# Patient Record
Sex: MALE
[De-identification: names, ages, dates, MRNs, and addresses within clinical notes are randomized per-mention and may not be internally consistent; named-entity substitution may affect disease eponyms.]

---

## 2022-08-24 PROBLEM — Z00.00 ENCOUNTER FOR PREVENTIVE HEALTH EXAMINATION: Status: ACTIVE | Noted: 2022-08-24

## 2022-08-26 ENCOUNTER — APPOINTMENT (OUTPATIENT)
Dept: HEART AND VASCULAR | Facility: CLINIC | Age: 37
End: 2022-08-26

## 2022-08-26 ENCOUNTER — NON-APPOINTMENT (OUTPATIENT)
Age: 37
End: 2022-08-26

## 2022-08-26 VITALS
DIASTOLIC BLOOD PRESSURE: 92 MMHG | WEIGHT: 315 LBS | SYSTOLIC BLOOD PRESSURE: 126 MMHG | OXYGEN SATURATION: 94 % | TEMPERATURE: 96.8 F | HEIGHT: 75 IN | HEART RATE: 98 BPM | BODY MASS INDEX: 39.17 KG/M2

## 2022-08-26 PROCEDURE — 93000 ELECTROCARDIOGRAM COMPLETE: CPT

## 2022-08-26 PROCEDURE — 99204 OFFICE O/P NEW MOD 45 MIN: CPT | Mod: 25

## 2022-08-26 NOTE — PHYSICAL EXAM
[Well Developed] : well developed [Well Nourished] : well nourished [No Acute Distress] : no acute distress [Normal Conjunctiva] : normal conjunctiva [Normal] : normal venous pressure, no carotid bruit [No Carotid Bruit] : no carotid bruit [Normal S1, S2] : normal S1, S2 [No Murmur] : no murmur [No Rub] : no rub [phone] [No Gallop] : no gallop [Clear Lung Fields] : clear lung fields [Good Air Entry] : good air entry [No Respiratory Distress] : no respiratory distress  [Soft] : abdomen soft [Non Tender] : non-tender [No Masses/organomegaly] : no masses/organomegaly [Normal Bowel Sounds] : normal bowel sounds [Normal Gait] : normal gait [No Edema] : no edema [No Cyanosis] : no cyanosis [No Clubbing] : no clubbing [No Varicosities] : no varicosities [No Rash] : no rash [No Skin Lesions] : no skin lesions [Alert and Oriented] : alert and oriented

## 2022-08-27 NOTE — DISCUSSION/SUMMARY
[Patient] : the patient [FreeTextEntry4] : Preop Eval [FreeTextEntry1] : Mr. Galeana is a 37 y.o male with a hx of morbid obesity who presents for evaluation and clearance for sleeve bariatric surgery in October of this year. Pt is overall feeling fine with no physical concerns or complaints. Reason for surgery was discussed as well as lifestyle modifications needed for cardiovascular disease risk reduction, including diet and exercise. EKG was wnl. Echo needed. Sleep study is scheduled for 09/2022. Overall, low risk for surgery and is cleared at this time. Will follow up with results of echo and sleep study.

## 2022-08-27 NOTE — HISTORY OF PRESENT ILLNESS
[FreeTextEntry1] : 38 y/o M w morbid obesity here for clearance for sleeve bariatric surgery with Dr. Sr with [facility] in October.\par \par Pt overall is feeling fine, no active concerns or complaints. Mentions he was interest in bariatric surgery because he has had issues losing weight for the past few years. Has tried dieting and working out and all have been unsuccessful. Mentions that his wife says he as episodes of not breathing while sleeping, but as not been evaluated for it and it has not caused him any significant discomfort. He has a sleep study scheduled in 09/2022\par \par CARDIOMETABOLIC RISK FACTORS:\par Family History: Mother no significant history, Father has hx of diabetes\par Lifestyle History:\par   -Mediterranean Diet Score (9 question survey) was x. 3\par       (8-9: optimal, 6-7: near-optimal, 4-5: suboptimal, 0-3: markedly suboptimal)\par   -Self-described diet: for breakfast consumes eggs, sausage and narvaez; steak for lunch; dinner depends on his mood and sometimes fasts. \par   -Exercise: Patient reports exercising at a moderate level for 60 minutes a day walking.\par   -Smoking: Former smoker (1/2 PPD x 4 years; quit 2 years ago).\par   -EtOH: social, once a month\par   -Ilicit drug use: denies\par   -Stress: Moderate financial stress, self employed\par   -Sleep apnea: [ex. STOPBANG]\par ===============================\par RADIOLOGY/IMAGING/DIAGNOSTIC TESTING:\par  -ECHO (date): none\par  -EKG (date): wnl (08/26/2022)\par  -Coronary CTA: none\par  -Calcium score: none\par \par LABS: (results from 07/12/2022)\par  -lipid panel: Total 174, Trig 51, , HDL 48\par  -A1c: 5.8 \par  -lipoprotein A:\par  -TSH\par \par CARDIOMETABOLIC RISK FACTORS:\par Family History: Mother no significant history, Father has hx of diabetes\par

## 2022-08-27 NOTE — ASSESSMENT
[FreeTextEntry1] : -EKG: wnl\par -Social:\par -Self-reported physical activity: no limitation walking on flat ground or stairs.\par \par Assessment:\par -Mets >4\par -RCRI - Class 2 risk, indicating 6% % 30-day risk of death, MI, or cardiac arrest\par -Petersen - 0.1 % risk of MI or cardiac arrest within 30 days of surgery\par \par Pt is low-risk for low-risk surgery.\par There are no apparent contraindications for sleeve bariatric surgery\par

## 2022-08-27 NOTE — END OF VISIT
[] : A student assisted with documenting this visit. I have reviewed and verified all information documented by the student, and made modifications to such information, when appropriate. [FreeTextEntry3] : Patient seen and examined. Case discussed with medical student. Agree with plan as detailed above.  [Time Spent: ___ minutes] : I have spent [unfilled] minutes of time on the encounter.

## 2022-09-09 ENCOUNTER — APPOINTMENT (OUTPATIENT)
Dept: HEART AND VASCULAR | Facility: CLINIC | Age: 37
End: 2022-09-09

## 2022-09-12 ENCOUNTER — APPOINTMENT (OUTPATIENT)
Dept: CARDIOLOGY | Facility: CLINIC | Age: 37
End: 2022-09-12

## 2022-09-12 DIAGNOSIS — E66.01 MORBID (SEVERE) OBESITY DUE TO EXCESS CALORIES: ICD-10-CM

## 2022-09-12 PROCEDURE — 97802 MEDICAL NUTRITION INDIV IN: CPT | Mod: 95

## 2022-09-19 ENCOUNTER — APPOINTMENT (OUTPATIENT)
Dept: PULMONOLOGY | Facility: CLINIC | Age: 37
End: 2022-09-19

## 2022-09-19 VITALS
OXYGEN SATURATION: 97 % | HEART RATE: 81 BPM | SYSTOLIC BLOOD PRESSURE: 122 MMHG | BODY MASS INDEX: 39.17 KG/M2 | TEMPERATURE: 97.9 F | HEIGHT: 75 IN | DIASTOLIC BLOOD PRESSURE: 85 MMHG | WEIGHT: 315 LBS

## 2022-09-19 PROCEDURE — 99204 OFFICE O/P NEW MOD 45 MIN: CPT

## 2022-09-19 NOTE — HISTORY OF PRESENT ILLNESS
[Never] : never [TextBox_4] : 37 year old male, never smoker, referred to pulmonary clinic by Dr. Pacheco for pulmonary evaluation before bariatric surgery\par Patient denies cough, SOB, chest pain or pulmonary illness in last 1 year. He is able to go three flights of stairs without any difficulty in breathing. Patient c/o mild snoring but no witnessed apnea, early morning headache or choking at night time or excessive daytime sleepiness. ESS score is 4.\par  [ESS] : 3

## 2022-09-19 NOTE — REASON FOR VISIT
[Consultation] : a consultation [Pre-op Risk Stratification] : pre-op risk stratification [TextBox_13] : Claude Pacheco MD (phone [phone] , email: [email or web address])

## 2022-09-19 NOTE — CONSULT LETTER
[Dear  ___] : Dear  [unfilled], [Consult Letter:] : I had the pleasure of evaluating your patient, [unfilled]. [Please see my note below.] : Please see my note below. [Consult Closing:] : Thank you very much for allowing me to participate in the care of this patient.  If you have any questions, please do not hesitate to contact me. [FreeTextEntry3] : Sincerely\par \par Eric Sandy MD [facility]\par , [employer]\par Associate , Pulmonary and Critical Care Fellowship\par Pulmonary and Critical Care\par [facility]\par Phone: [phone]\par

## 2022-09-19 NOTE — DISCUSSION/SUMMARY
[FreeTextEntry1] : 37 year old male, never smoker, referred to pulmonary clinic by Dr. Pacheco for pulmonary evaluation before bariatric surgery.\par \par Review:\par CXR report (7/22): NAD\par \par Plan:\par - Home sleep study\par - PFT/ 6 minute walk test\par - TTM after Sleep study and PFT

## 2022-09-19 NOTE — REVIEW OF SYSTEMS
[Fever] : no fever [Chills] : no chills [Dry Eyes] : no dry eyes [Eye Irritation] : no eye irritation [Cough] : no cough [Sputum] : no sputum [Chest Discomfort] : no chest discomfort [Orthopnea] : no orthopnea [Hay Fever] : no hay fever [Nasal Discharge] : no nasal discharge [GERD] : no gerd [Diarrhea] : no diarrhea [Nocturia] : no nocturia [Arthralgias] : no arthralgias [Trauma/ Injury] : no trauma/ injury [Anemia] : no anemia [History of Iron Deficiency] : no history of iron deficiency [Headache] : no headache [Dizziness] : no dizziness

## 2022-09-19 NOTE — PHYSICAL EXAM
[No Acute Distress] : no acute distress [Well Nourished] : well nourished [Normal Oropharynx] : normal oropharynx [III] : Mallampati Class: III [Normal Appearance] : normal appearance [No JVD] : no jvd [Normal Rate/Rhythm] : normal rate/rhythm [Normal S1, S2] : normal s1, s2 [No Resp Distress] : no resp distress [Clear to Auscultation Bilaterally] : clear to auscultation bilaterally [Benign] : benign [Not Tender] : not tender [Normal Gait] : normal gait [No Clubbing] : no clubbing [No Edema] : no edema [Normal Color/ Pigmentation] : normal color/ pigmentation [No Rash] : no rash [No Focal Deficits] : no focal deficits [No Sensory Deficits] : no sensory deficits [Oriented x3] : oriented x3 [Normal Affect] : normal affect

## 2022-09-22 ENCOUNTER — APPOINTMENT (OUTPATIENT)
Dept: PULMONOLOGY | Facility: CLINIC | Age: 37
End: 2022-09-22

## 2022-09-22 PROCEDURE — 94060 EVALUATION OF WHEEZING: CPT

## 2022-09-22 PROCEDURE — 94729 DIFFUSING CAPACITY: CPT

## 2022-09-22 PROCEDURE — 94618 PULMONARY STRESS TESTING: CPT

## 2022-09-22 PROCEDURE — 94726 PLETHYSMOGRAPHY LUNG VOLUMES: CPT

## 2022-10-13 ENCOUNTER — OUTPATIENT (OUTPATIENT)
Dept: OUTPATIENT SERVICES | Facility: HOSPITAL | Age: 37
LOS: 1 days | End: 2022-10-13
Payer: COMMERCIAL

## 2022-10-13 ENCOUNTER — APPOINTMENT (OUTPATIENT)
Dept: SLEEP CENTER | Facility: HOME HEALTH | Age: 37
End: 2022-10-13

## 2022-10-13 PROCEDURE — 95800 SLP STDY UNATTENDED: CPT | Mod: 26

## 2022-10-13 PROCEDURE — 95800 SLP STDY UNATTENDED: CPT

## 2022-10-14 DIAGNOSIS — G47.33 OBSTRUCTIVE SLEEP APNEA (ADULT) (PEDIATRIC): ICD-10-CM

## 2022-10-20 ENCOUNTER — APPOINTMENT (OUTPATIENT)
Dept: PULMONOLOGY | Facility: CLINIC | Age: 37
End: 2022-10-20

## 2022-10-20 DIAGNOSIS — Z01.811 ENCOUNTER FOR PREPROCEDURAL RESPIRATORY EXAMINATION: ICD-10-CM

## 2022-10-20 PROCEDURE — 99443: CPT

## 2022-10-27 PROBLEM — Z01.811 PREOP PULMONARY/RESPIRATORY EXAM: Status: ACTIVE | Noted: 2022-09-19

## 2022-10-27 NOTE — HISTORY OF PRESENT ILLNESS
[Home] : at home, [unfilled] , at the time of the visit. [Medical Office: ([facility])___] : at the medical office located in  [Verbal consent obtained from patient] : the patient, [unfilled] [Never] : never [TextBox_4] : 37 year old male, never smoker, referred to pulmonary clinic by Dr. Pacheco for pulmonary evaluation before bariatric surgery\par Patient denies cough, SOB, chest pain or pulmonary illness in last 1 year. He is able to go three flights of stairs without any difficulty in breathing. Patient c/o mild snoring but no witnessed apnea, early morning headache or choking at night time or excessive daytime sleepiness. ESS score is 4.\par \par 10/20/22:\par Sleep study and PFT was done. No new complaint. \par  [ESS] : 3

## 2022-10-27 NOTE — DISCUSSION/SUMMARY
[FreeTextEntry1] : 37 year old male, never smoker, referred to pulmonary clinic by Dr. Pacheco for pulmonary evaluation before bariatric surgery.\par \par Review:\par - CXR report (7/22): NAD\par - Sleep study (10/22): AHI 17.8 (CMS), 25.9 (AASM). Lowest oxygen saturation 74%, 4.6% time spent below 88%\par - PFT (9 / 22): FEV1 82, FEV1/FVC 75, TLC 85, DLCO 78, Rev 6%\par - Six min walk test (9/22): 96% on RA with exertion. HR increased to 91/min\par \par Plan:\par (1) Pre-Op Pulmonary examination:\par Patient has good functional status. He denied any pulmonary symptoms. She denied history of previous pulmonary illness or admission to hospital for pulmonary illness. Pulmonary exam was normal with no hypoxia on ambulation. CXR did not reveal any acute parenchymal abnormality. Sleep study showed moderate sleep apnea.\par \par Patient may undergo bariatric surgery. His ARISCAT score 31. She has moderate risk (13.3%) of in-hospital post-op pulmonary complications (composite including respiratory failure, respiratory infection, pleural effusion, atelectasis, pneumothorax, bronchospasm, aspiration pneumonitis).\par Recommendation:\par - Avoid Opioids during surgery\par - Extubate in upright position\par - Would recommend to extubate once patient is awake\par - BIPAP may be required after extubation\par \par (2) Moderate Sleep apnea:\par - AHI 17.8, ESS score 3\par - Plan for Auto CPAP with nasal pillow\par

## 2022-10-27 NOTE — CONSULT LETTER
[Dear  ___] : Dear  [unfilled], [Consult Letter:] : I had the pleasure of evaluating your patient, [unfilled]. [Please see my note below.] : Please see my note below. [Consult Closing:] : Thank you very much for allowing me to participate in the care of this patient.  If you have any questions, please do not hesitate to contact me. [FreeTextEntry3] : Sincerely\par \par Eric Sandy MD [facility]\par , [employer]\par Associate , Pulmonary and Critical Care Fellowship\par Pulmonary and Critical Care\par [facility]\par Phone: [phone]\par

## 2023-03-17 ENCOUNTER — APPOINTMENT (OUTPATIENT)
Dept: HEART AND VASCULAR | Facility: CLINIC | Age: 38
End: 2023-03-17
Payer: MEDICAID

## 2023-03-17 ENCOUNTER — NON-APPOINTMENT (OUTPATIENT)
Age: 38
End: 2023-03-17

## 2023-03-17 VITALS
HEIGHT: 75 IN | SYSTOLIC BLOOD PRESSURE: 128 MMHG | WEIGHT: 315 LBS | DIASTOLIC BLOOD PRESSURE: 90 MMHG | BODY MASS INDEX: 39.17 KG/M2 | OXYGEN SATURATION: 94 % | TEMPERATURE: 97.6 F | HEART RATE: 95 BPM

## 2023-03-17 DIAGNOSIS — I10 ESSENTIAL (PRIMARY) HYPERTENSION: ICD-10-CM

## 2023-03-17 DIAGNOSIS — G47.33 OBSTRUCTIVE SLEEP APNEA (ADULT) (PEDIATRIC): ICD-10-CM

## 2023-03-17 PROCEDURE — 93000 ELECTROCARDIOGRAM COMPLETE: CPT

## 2023-03-17 PROCEDURE — 99215 OFFICE O/P EST HI 40 MIN: CPT | Mod: 25

## 2023-03-17 RX ORDER — AMLODIPINE BESYLATE 2.5 MG/1
2.5 TABLET ORAL DAILY
Qty: 90 | Refills: 3 | Status: ACTIVE | COMMUNITY
Start: 2023-03-17 | End: 1900-01-01

## 2023-03-18 NOTE — HISTORY OF PRESENT ILLNESS
[FreeTextEntry1] : 37 y/o M w morbid obesity, newly noted HTN, sleep apnea here for clearance for sleeve bariatric surgery with Dr. Sr with [facility].\par \par He reports that he is able to walk on flat group for unlimited amounts of time and only notes fatigue or dyspnea with climbing multiple flights of stairs and carrying items at the same time. No chest pain. \par \par He has had a lot of stress with his daughter being ill. He feels that his blood pressure is related to that. On review of BP's at his other physicina visits, BP was recently in the 140's systolic there as well. \par \par CARDIOMETABOLIC RISK FACTORS:\par Family History: Mother no significant history, Father has hx of diabetes\par Lifestyle History:\par  -Mediterranean Diet Score (9 question survey) was x. 3\par  (8-9: optimal, 6-7: near-optimal, 4-5: suboptimal, 0-3: markedly suboptimal)\par  -Self-described diet: for breakfast consumes eggs, sausage and narvaez; steak for lunch; dinner depends on his mood and sometimes fasts. \par  -Exercise: Patient reports exercising at a moderate level for 60 minutes a day walking.\par  -Smoking: Former smoker (1/2 PPD x 4 years; quit 2 years ago).\par  -EtOH: social, once a month\par  -Ilicit drug use: denies\par  -Stress: Moderate financial stress, self employed\par  -Sleep apnea: [ex. STOPBANG]\par ===============================\par RADIOLOGY/IMAGING/DIAGNOSTIC TESTING:\par  -ECHO (date): none\par  -EKG (date): wnl (08/26/2022)\par  -Coronary CTA: none\par  -Calcium score: none\par \par LABS: (results from 07/12/2022)\par  -lipid panel: Total 174, Trig 51, , HDL 48\par  -A1c: 5.8 \par  -lipoprotein A: none\par  -TSH\par \par CARDIOMETABOLIC RISK FACTORS:\par Family History: Mother no significant history, Father has hx of diabetes\par \par